# Patient Record
Sex: MALE | Race: WHITE
[De-identification: names, ages, dates, MRNs, and addresses within clinical notes are randomized per-mention and may not be internally consistent; named-entity substitution may affect disease eponyms.]

---

## 2019-02-04 ENCOUNTER — HOSPITAL ENCOUNTER (EMERGENCY)
Dept: HOSPITAL 76 - ED | Age: 29
Discharge: HOME | End: 2019-02-04
Payer: COMMERCIAL

## 2019-02-04 VITALS — SYSTOLIC BLOOD PRESSURE: 113 MMHG | DIASTOLIC BLOOD PRESSURE: 86 MMHG

## 2019-02-04 DIAGNOSIS — K52.9: Primary | ICD-10-CM

## 2019-02-04 DIAGNOSIS — E86.0: ICD-10-CM

## 2019-02-04 LAB
ALBUMIN DIAFP-MCNC: 5.4 G/DL (ref 3.2–5.5)
ALBUMIN/GLOB SERPL: 1.5 {RATIO} (ref 1–2.2)
ALP SERPL-CCNC: 70 IU/L (ref 42–121)
ALT SERPL W P-5'-P-CCNC: 27 IU/L (ref 10–60)
ANION GAP SERPL CALCULATED.4IONS-SCNC: 13 MMOL/L (ref 6–13)
AST SERPL W P-5'-P-CCNC: 41 IU/L (ref 10–42)
BASOPHILS NFR BLD AUTO: 0 10^3/UL (ref 0–0.1)
BASOPHILS NFR BLD AUTO: 0.4 %
BILIRUB BLD-MCNC: 1.7 MG/DL (ref 0.2–1)
BUN SERPL-MCNC: 21 MG/DL (ref 6–20)
CALCIUM UR-MCNC: 10 MG/DL (ref 8.5–10.3)
CHLORIDE SERPL-SCNC: 98 MMOL/L (ref 101–111)
CO2 SERPL-SCNC: 26 MMOL/L (ref 21–32)
CREAT SERPLBLD-SCNC: 1.1 MG/DL (ref 0.6–1.2)
EOSINOPHIL # BLD AUTO: 0.3 10^3/UL (ref 0–0.7)
EOSINOPHIL NFR BLD AUTO: 3.5 %
ERYTHROCYTE [DISTWIDTH] IN BLOOD BY AUTOMATED COUNT: 13.8 % (ref 12–15)
GFRSERPLBLD MDRD-ARVRAT: 80 ML/MIN/{1.73_M2} (ref 89–?)
GLOBULIN SER-MCNC: 3.6 G/DL (ref 2.1–4.2)
GLUCOSE SERPL-MCNC: 98 MG/DL (ref 70–100)
HGB UR QL STRIP: 17.5 G/DL (ref 14–18)
LIPASE SERPL-CCNC: 25 U/L (ref 22–51)
LYMPHOCYTES # SPEC AUTO: 1.7 10^3/UL (ref 1.5–3.5)
LYMPHOCYTES NFR BLD AUTO: 19.3 %
MCH RBC QN AUTO: 28.9 PG (ref 27–31)
MCHC RBC AUTO-ENTMCNC: 34.5 G/DL (ref 32–36)
MCV RBC AUTO: 83.6 FL (ref 80–94)
MONOCYTES # BLD AUTO: 0.7 10^3/UL (ref 0–1)
MONOCYTES NFR BLD AUTO: 8.3 %
NEUTROPHILS # BLD AUTO: 6.1 10^3/UL (ref 1.5–6.6)
NEUTROPHILS # SNV AUTO: 8.9 X10^3/UL (ref 4.8–10.8)
NEUTROPHILS NFR BLD AUTO: 68.5 %
PDW BLD AUTO: 7.7 FL (ref 7.4–11.4)
PLATELET # BLD: 262 10^3/UL (ref 130–450)
PROT SPEC-MCNC: 9 G/DL (ref 6.7–8.2)
RBC MAR: 6.07 10^6/UL (ref 4.7–6.1)
SODIUM SERPLBLD-SCNC: 137 MMOL/L (ref 135–145)

## 2019-02-04 PROCEDURE — 80053 COMPREHEN METABOLIC PANEL: CPT

## 2019-02-04 PROCEDURE — 36415 COLL VENOUS BLD VENIPUNCTURE: CPT

## 2019-02-04 PROCEDURE — 87275 INFLUENZA B AG IF: CPT

## 2019-02-04 PROCEDURE — 99283 EMERGENCY DEPT VISIT LOW MDM: CPT

## 2019-02-04 PROCEDURE — 96374 THER/PROPH/DIAG INJ IV PUSH: CPT

## 2019-02-04 PROCEDURE — 85025 COMPLETE CBC W/AUTO DIFF WBC: CPT

## 2019-02-04 PROCEDURE — 83690 ASSAY OF LIPASE: CPT

## 2019-02-04 PROCEDURE — 96361 HYDRATE IV INFUSION ADD-ON: CPT

## 2019-02-04 PROCEDURE — 87276 INFLUENZA A AG IF: CPT

## 2019-02-04 NOTE — ED PHYSICIAN DOCUMENTATION
PD HPI NVD





- Stated complaint


Stated Complaint: VOMITING





- Chief complaint


Chief Complaint: Abd Pain





- History obtained from


History obtained from: Patient





- History of Present Illness


Timing - onset: Enter  time (0800), Yesterday


Timing - duration: Days (1)


Timing - details: Abrupt onset, Still present


Associated symptoms: Abdominal pain, Near syncope / syncope


Contributing factors: Other (started after a birthday party for 2-3 y/o 

children)


Improved by: Laying still


Similar symptoms before: Has not had sx before


Recently seen: Not recently seen





- Additonal information


Additional information: 





Previously well 28-year-old male developed acute nausea and vomiting yesterday 

morning about 8:00 and he has had vomiting throughout the day yesterday he has 

had mostly dry heaves he did try to drink some fluids this morning since he was 

so thirsty and he vomited everything that he put down.  He states that he was at

a birthday party for his daughter the day before symptoms began they had some 

pizza nobody else at the party appeared ill.  He has not had these symptoms 

previously.  He states he feels the pain that he is having is from the vomiting 

itself.





Review of Systems


Constitutional: reports: Myalgias, Fatigue.  denies: Fever


Eyes: denies: Decreased vision


Ears: denies: Ear pain


Nose: denies: Rhinorrhea / runny nose, Congestion


Throat: denies: Sore throat


Cardiac: denies: Chest pain / pressure, Palpitations


Respiratory: denies: Dyspnea, Cough


GI: reports: Abdominal Pain, Nausea, Vomiting, Diarrhea (one loose stool).  

denies: Constipation


: denies: Dysuria, Frequency


Skin: denies: Rash


Musculoskeletal: denies: Neck pain, Back pain, Extremity pain





PD PAST MEDICAL HISTORY





- Past Medical History


Past Medical History: No





- Past Surgical History


Past Surgical History: No





- Present Medications


Home Medications: 


                                Ambulatory Orders











 Medication  Instructions  Recorded  Confirmed


 


Ondansetron Odt [Zofran] 4 mg TL Q6H PRN #10 tablet 02/04/19 














- Allergies


Allergies/Adverse Reactions: 


                                    Allergies











Allergy/AdvReac Type Severity Reaction Status Date / Time


 


No Known Drug Allergies Allergy   Verified 02/04/19 09:09














- Social History


Does the pt smoke?: No


Smoking Status: Never smoker


Does the pt drink ETOH?: Yes


Does the pt have substance abuse?: No





- Immunizations


Immunizations are current?: Yes





- POLST


Patient has POLST: No





PD ED PE NORMAL





- General


General: Alert and oriented X 3, No acute distress, Well developed/nourished





- HEENT


HEENT: Atraumatic, PERRL, EOMI





- Neck


Neck: Supple, no meningeal sign, No bony TTP





- Cardiac


Cardiac: RRR, No murmur





- Respiratory


Respiratory: No respiratory distress, Clear bilaterally





- Abdomen


Abdomen: Soft, Other (mild general tenderness, no garding or rebound )





- Back


Back: No CVA TTP, No spinal TTP





- Derm


Derm: Normal color, Warm and dry, No rash





- Extremities


Extremities: No deformity, No edema





- Neuro


Neuro: Alert and oriented X 3, CNs 2-12 intact, No motor deficit, Normal speech


Eye Opening: Spontaneous


Motor: Obeys Commands


Verbal: Oriented


GCS Score: 15





- Psych


Psych: Normal mood, Normal affect





Results





- Vitals


Vitals: 


                               Vital Signs - 24 hr











  02/04/19 02/04/19





  09:06 09:18


 


Temperature 36.8 C 


 


Heart Rate 82 82


 


Respiratory 16 14





Rate  


 


Blood Pressure 113/86 H 113/86 H


 


O2 Saturation 100 100








                                     Oxygen











O2 Source                      Room air

















- Labs


Labs: 


                                Laboratory Tests











  02/04/19 02/04/19 02/04/19





  09:15 10:00 10:00


 


WBC   8.9 


 


RBC   6.07 


 


Hgb   17.5 


 


Hct   50.8 


 


MCV   83.6 


 


MCH   28.9 


 


MCHC   34.5 


 


RDW   13.8 


 


Plt Count   262 


 


MPV   7.7 


 


Neut # (Auto)   6.1 


 


Lymph # (Auto)   1.7 


 


Mono # (Auto)   0.7 


 


Eos # (Auto)   0.3 


 


Baso # (Auto)   0.0 


 


Absolute Nucleated RBC   0.01 


 


Nucleated RBC %   0.2 


 


Sodium    137


 


Potassium    3.7


 


Chloride    98 L


 


Carbon Dioxide    26


 


Anion Gap    13.0


 


BUN    21 H


 


Creatinine    1.1


 


Estimated GFR (MDRD)    80 L


 


Glucose    98


 


Calcium    10.0


 


Total Bilirubin    1.7 H


 


AST    41


 


ALT    27


 


Alkaline Phosphatase    70


 


Total Protein    9.0 H


 


Albumin    5.4


 


Globulin    3.6


 


Albumin/Globulin Ratio    1.5


 


Lipase    25


 


Influenza A (Rapid)  Negative  


 


Influenza B (Rapid)  Negative  














Procedures





- IVC sono (time)


  ** 1000


Bedside IVC sono: IVC measures (cm) (1.29), IVC collapsed c insp (cm) 

(complete), Dehydration (est 1 liter deficit)





PD MEDICAL DECISION MAKING





- ED course


Complexity details: considered differential, d/w patient


ED course: 





28-year-old male with acute onset of gastroenteritis as vomiting and is mildly 

dehydrated he is administered saline and Zofran intravenously.





Departure





- Departure


Disposition: 01 Home, Self Care


Clinical Impression: 


 Gastroenteritis





Condition: Stable


Instructions:  ED Food Poison Or Gastroenteritis


Follow-Up: 


[facility] [place] [Provider Group]


Prescriptions: 


Ondansetron Odt [Zofran] 4 mg TL Q6H PRN #10 tablet


 PRN Reason: Nausea / Vomiting


Forms:  Activity restrictions